# Patient Record
Sex: FEMALE | Race: BLACK OR AFRICAN AMERICAN | NOT HISPANIC OR LATINO | Employment: PART TIME | URBAN - METROPOLITAN AREA
[De-identification: names, ages, dates, MRNs, and addresses within clinical notes are randomized per-mention and may not be internally consistent; named-entity substitution may affect disease eponyms.]

---

## 2018-01-25 ENCOUNTER — CLINICAL SUPPORT (OUTPATIENT)
Dept: URGENT CARE | Facility: CLINIC | Age: 38
End: 2018-01-25

## 2018-01-25 DIAGNOSIS — Z02.83 ENCOUNTER FOR DRUG SCREENING: Primary | ICD-10-CM

## 2018-01-25 PROCEDURE — 80305 DRUG TEST PRSMV DIR OPT OBS: CPT | Mod: S$GLB,,, | Performed by: PREVENTIVE MEDICINE

## 2020-01-28 ENCOUNTER — TELEPHONE (OUTPATIENT)
Dept: INTERNAL MEDICINE | Facility: CLINIC | Age: 40
End: 2020-01-28

## 2021-08-10 ENCOUNTER — TELEPHONE (OUTPATIENT)
Dept: SURGERY | Facility: CLINIC | Age: 41
End: 2021-08-10

## 2021-12-23 ENCOUNTER — IMMUNIZATION (OUTPATIENT)
Dept: PRIMARY CARE CLINIC | Facility: CLINIC | Age: 41
End: 2021-12-23
Payer: COMMERCIAL

## 2021-12-23 DIAGNOSIS — Z23 NEED FOR VACCINATION: Primary | ICD-10-CM

## 2021-12-23 PROCEDURE — 91301 COVID-19, MRNA, LNP-S, PF, 100 MCG/0.5 ML DOSE VACCINE: CPT | Mod: PBBFAC | Performed by: INTERNAL MEDICINE

## 2024-11-18 ENCOUNTER — TELEPHONE (OUTPATIENT)
Dept: PODIATRY | Facility: CLINIC | Age: 44
End: 2024-11-18
Payer: COMMERCIAL

## 2024-11-18 NOTE — TELEPHONE ENCOUNTER
Spoke with pt. Regarding getting her scheduled for appointment. Pt. Verbally confirmed new appointment time and date.

## 2024-11-19 ENCOUNTER — OFFICE VISIT (OUTPATIENT)
Dept: INTERNAL MEDICINE | Facility: CLINIC | Age: 44
End: 2024-11-19
Payer: COMMERCIAL

## 2024-11-19 VITALS
HEART RATE: 77 BPM | OXYGEN SATURATION: 98 % | SYSTOLIC BLOOD PRESSURE: 122 MMHG | WEIGHT: 185 LBS | HEIGHT: 69 IN | DIASTOLIC BLOOD PRESSURE: 75 MMHG | BODY MASS INDEX: 27.4 KG/M2

## 2024-11-19 DIAGNOSIS — Z13.1 SCREENING FOR DIABETES MELLITUS: ICD-10-CM

## 2024-11-19 DIAGNOSIS — Z13.220 SCREENING FOR LIPID DISORDERS: ICD-10-CM

## 2024-11-19 DIAGNOSIS — F17.200 SMOKING: ICD-10-CM

## 2024-11-19 DIAGNOSIS — Z11.59 ENCOUNTER FOR HEPATITIS C SCREENING TEST FOR LOW RISK PATIENT: ICD-10-CM

## 2024-11-19 DIAGNOSIS — Z76.89 ENCOUNTER TO ESTABLISH CARE: Primary | ICD-10-CM

## 2024-11-19 DIAGNOSIS — Z12.11 SCREENING FOR COLON CANCER: ICD-10-CM

## 2024-11-19 DIAGNOSIS — R53.83 OTHER FATIGUE: ICD-10-CM

## 2024-11-19 DIAGNOSIS — Z12.4 CERVICAL CANCER SCREENING: ICD-10-CM

## 2024-11-19 DIAGNOSIS — Z11.4 SCREENING FOR HIV (HUMAN IMMUNODEFICIENCY VIRUS): ICD-10-CM

## 2024-11-19 DIAGNOSIS — Z12.31 ENCOUNTER FOR SCREENING MAMMOGRAM FOR MALIGNANT NEOPLASM OF BREAST: ICD-10-CM

## 2024-11-19 PROCEDURE — 99999 PR PBB SHADOW E&M-EST. PATIENT-LVL V: CPT | Mod: PBBFAC,,,

## 2024-11-19 RX ORDER — VARENICLINE TARTRATE 0.5 (11)-1
KIT ORAL
Qty: 1 EACH | Refills: 0 | Status: SHIPPED | OUTPATIENT
Start: 2024-11-19

## 2024-11-19 NOTE — PROGRESS NOTES
Subjective     Chief Complaint:   Patient reports to clinic today to establish care.     History of Present Illness:  Ms. Katlyn Birch is a 44 y.o. female         No recent illness     Significant Med HX  No known medical hx  Last say a dr 7 years ago  Few miscarriages, 2/2 weak cervic      Current Meds  Women essential vitamins  Ibuprofen for teeth  Needs a crown    Allergies  NKDA    Surgical Hx  Dental, root canal    Family Hx  DM  Cancer- Doesn't know details  Dementia      Tobacco  Heavy box of cigars daily; Black and milds. Have thought about quitting and wants help. Never used anything. 20+ years    Alcohol  No    Drugs  No    Social  7 and 22 year old. Single. No pets. Work- NPO, hlp people get back to workforce. Support system, lots of family local.       Breast-  Cervical   Colonoscopy  Lung cancer       PAST HISTORY:     No past medical history on file.    No past surgical history on file.    No family history on file.    Social History     Socioeconomic History    Marital status: Single   Tobacco Use    Smoking status: Never   Substance and Sexual Activity    Alcohol use: No    Drug use: No    Sexual activity: Yes     Partners: Male       MEDICATIONS & ALLERGIES:     Current Outpatient Medications on File Prior to Visit   Medication Sig    famotidine (PEPCID) 20 MG tablet TK 1 T PO  BID FOR 14 DAYS    ondansetron (ZOFRAN-ODT) 4 MG disintegrating tablet Take 4 mg by mouth every 8 (eight) hours as needed.      PRENATAL VITS W-CA,FE,FA,<1MG, (PRENATAL VITAMIN ORAL) Take by mouth.      promethazine (PHENERGAN) 12.5 MG tablet Take 12.5 mg by mouth every 6 (six) hours as needed.       No current facility-administered medications on file prior to visit.       Review of patient's allergies indicates:  No Known Allergies    OBJECTIVE:     Vital Signs:  Vitals:    11/19/24 1533   BP: 122/75   BP Location: Right arm   Patient Position: Sitting   Pulse: 77   SpO2: 98%   Weight: 83.9 kg (185 lb)   Height:  "5' 9" (1.753 m)       Body mass index is 27.32 kg/m².     Physical Exam  Vitals and nursing note reviewed.   Constitutional:       General: She is not in acute distress.     Appearance: She is not ill-appearing.   HENT:      Head: Normocephalic and atraumatic.      Right Ear: External ear normal.      Left Ear: External ear normal.      Mouth/Throat:      Mouth: Mucous membranes are moist.      Pharynx: Oropharynx is clear. No oropharyngeal exudate.   Eyes:      General: No scleral icterus.     Conjunctiva/sclera: Conjunctivae normal.      Pupils: Pupils are equal, round, and reactive to light.   Neck:      Vascular: No JVD.   Cardiovascular:      Rate and Rhythm: Normal rate and regular rhythm.      Pulses: Normal pulses.      Heart sounds: Normal heart sounds. No murmur heard.     No friction rub.   Pulmonary:      Effort: Pulmonary effort is normal. No respiratory distress.      Breath sounds: Normal breath sounds. No wheezing or rales.   Abdominal:      General: Abdomen is flat. Bowel sounds are normal. There is no distension.      Palpations: Abdomen is soft. There is no mass.      Tenderness: There is no abdominal tenderness. There is no rebound.   Musculoskeletal:         General: No tenderness or signs of injury. Normal range of motion.      Cervical back: Normal range of motion and neck supple.      Right lower leg: No edema.      Left lower leg: No edema.   Skin:     General: Skin is warm and dry.      Coloration: Skin is not jaundiced or pale.      Findings: No erythema.   Neurological:      General: No focal deficit present.      Mental Status: She is alert and oriented to person, place, and time.   Psychiatric:         Mood and Affect: Mood and affect normal.         Judgment: Judgment normal.       Laboratory  No results found for this or any previous visit (from the past 24 hours).    Diagnostic Results:      Health Maintenance Due   Topic Date Due    Hepatitis C Screening  Never done    Lipid Panel  " Never done    TETANUS VACCINE  Never done    Mammogram  Never done    Hemoglobin A1c (Diabetic Prevention Screening)  Never done    Cervical Cancer Screening  03/22/2015    Colorectal Cancer Screening  Never done    Influenza Vaccine (1) Never done    COVID-19 Vaccine (2 - 2024-25 season) 09/01/2024         ASSESSMENT & PLAN:     1. Encounter to establish care  -     Comprehensive Metabolic Panel; Future; Expected date: 11/19/2024  -     CBC Auto Differential; Future; Expected date: 11/19/2024  -     TSH; Future; Expected date: 11/19/2024  -     Hemoglobin A1C; Future; Expected date: 11/19/2024    2. Other fatigue    3. Screening for HIV (human immunodeficiency virus)  -     HIV 1/2 Ag/Ab (4th Gen); Future; Expected date: 11/19/2024    4. Screening for diabetes mellitus  -     Hemoglobin A1C; Future; Expected date: 11/19/2024    5. Screening for lipid disorders  -     Lipid Panel; Future; Expected date: 11/19/2024    6. Encounter for hepatitis C screening test for low risk patient  -     Hepatitis C Antibody; Future; Expected date: 11/19/2024    7. Screening for colon cancer  -     Ambulatory referral/consult to Endo Procedure ; Future; Expected date: 11/20/2024    8. Encounter for screening mammogram for malignant neoplasm of breast  -     Mammo Digital Screening Bilat w/ Sergio; Future; Expected date: 11/19/2024    9. Cervical cancer screening    10. Smoking  -     Ambulatory referral/consult to Smoking Cessation Program; Future; Expected date: 11/26/2024  -     Complete PFT w/ bronchodilator; Future  -     Ambulatory referral/consult to Lung Cancer Screening; Future; Expected date: 11/26/2024  -     CT Chest Lung Screening Low Dose; Future; Expected date: 11/19/2024    Other orders  -     varenicline (CHANTIX STARTING MONTH BOX) 0.5 mg (11)- 1 mg (42) tablet; Take one 0.5mg tab by mouth once daily X3 days,then increase to one 0.5mg tab twice daily X4 days,then increase to one 1mg tab twice daily  Dispense:  1 each; Refill: 0      CRC, Lung cancer, Breast cancer screening ordered    Follows with ob, will defer cervical cancer to them.     Smoker cessation program ref placed. Chantix ordered. PFT ordered    Will follow up labs RTC SHANTA Chamorro DO, MPH  Internal Medicine PGY-2  [facility]

## 2024-11-20 ENCOUNTER — LAB VISIT (OUTPATIENT)
Dept: LAB | Facility: HOSPITAL | Age: 44
End: 2024-11-20
Payer: COMMERCIAL

## 2024-11-20 DIAGNOSIS — Z76.89 ENCOUNTER TO ESTABLISH CARE: ICD-10-CM

## 2024-11-20 DIAGNOSIS — Z11.4 SCREENING FOR HIV (HUMAN IMMUNODEFICIENCY VIRUS): ICD-10-CM

## 2024-11-20 DIAGNOSIS — Z13.1 SCREENING FOR DIABETES MELLITUS: ICD-10-CM

## 2024-11-20 DIAGNOSIS — Z13.220 SCREENING FOR LIPID DISORDERS: ICD-10-CM

## 2024-11-20 LAB
BASOPHILS # BLD AUTO: 0.04 K/UL (ref 0–0.2)
BASOPHILS NFR BLD: 0.7 % (ref 0–1.9)
CHOLEST SERPL-MCNC: 190 MG/DL (ref 120–199)
CHOLEST/HDLC SERPL: 3.4 {RATIO} (ref 2–5)
DIFFERENTIAL METHOD BLD: ABNORMAL
EOSINOPHIL # BLD AUTO: 0.3 K/UL (ref 0–0.5)
EOSINOPHIL NFR BLD: 5.5 % (ref 0–8)
ERYTHROCYTE [DISTWIDTH] IN BLOOD BY AUTOMATED COUNT: 14.6 % (ref 11.5–14.5)
ESTIMATED AVG GLUCOSE: 105 MG/DL (ref 68–131)
HBA1C MFR BLD: 5.3 % (ref 4–5.6)
HCT VFR BLD AUTO: 36.2 % (ref 37–48.5)
HDLC SERPL-MCNC: 56 MG/DL (ref 40–75)
HDLC SERPL: 29.5 % (ref 20–50)
HGB BLD-MCNC: 11.8 G/DL (ref 12–16)
HIV 1+2 AB+HIV1 P24 AG SERPL QL IA: NORMAL
IMM GRANULOCYTES # BLD AUTO: 0.01 K/UL (ref 0–0.04)
IMM GRANULOCYTES NFR BLD AUTO: 0.2 % (ref 0–0.5)
LDLC SERPL CALC-MCNC: 106.8 MG/DL (ref 63–159)
LYMPHOCYTES # BLD AUTO: 2.4 K/UL (ref 1–4.8)
LYMPHOCYTES NFR BLD: 40.3 % (ref 18–48)
MCH RBC QN AUTO: 29.1 PG (ref 27–31)
MCHC RBC AUTO-ENTMCNC: 32.6 G/DL (ref 32–36)
MCV RBC AUTO: 89 FL (ref 82–98)
MONOCYTES # BLD AUTO: 0.7 K/UL (ref 0.3–1)
MONOCYTES NFR BLD: 10.8 % (ref 4–15)
NEUTROPHILS # BLD AUTO: 2.6 K/UL (ref 1.8–7.7)
NEUTROPHILS NFR BLD: 42.5 % (ref 38–73)
NONHDLC SERPL-MCNC: 134 MG/DL
NRBC BLD-RTO: 0 /100 WBC
PLATELET # BLD AUTO: 299 K/UL (ref 150–450)
PMV BLD AUTO: 10.1 FL (ref 9.2–12.9)
RBC # BLD AUTO: 4.05 M/UL (ref 4–5.4)
TRIGL SERPL-MCNC: 136 MG/DL (ref 30–150)
TSH SERPL DL<=0.005 MIU/L-ACNC: 1.44 UIU/ML (ref 0.4–4)
WBC # BLD AUTO: 6 K/UL (ref 3.9–12.7)

## 2024-11-20 PROCEDURE — 83036 HEMOGLOBIN GLYCOSYLATED A1C: CPT

## 2024-11-20 PROCEDURE — 36415 COLL VENOUS BLD VENIPUNCTURE: CPT

## 2024-11-20 PROCEDURE — 80061 LIPID PANEL: CPT

## 2024-11-20 PROCEDURE — 84443 ASSAY THYROID STIM HORMONE: CPT

## 2024-11-20 PROCEDURE — 87389 HIV-1 AG W/HIV-1&-2 AB AG IA: CPT

## 2024-11-20 PROCEDURE — 85025 COMPLETE CBC W/AUTO DIFF WBC: CPT

## 2024-11-29 ENCOUNTER — HOSPITAL ENCOUNTER (OUTPATIENT)
Dept: PULMONOLOGY | Facility: CLINIC | Age: 44
Discharge: HOME | End: 2024-11-29
Payer: COMMERCIAL

## 2024-11-29 ENCOUNTER — HOSPITAL ENCOUNTER (OUTPATIENT)
Dept: RADIOLOGY | Facility: HOSPITAL | Age: 44
Discharge: HOME | End: 2024-11-29
Payer: COMMERCIAL

## 2024-11-29 VITALS — BODY MASS INDEX: 26.58 KG/M2 | WEIGHT: 180 LBS

## 2024-11-29 DIAGNOSIS — F17.200 SMOKING: ICD-10-CM

## 2024-11-29 DIAGNOSIS — Z12.31 ENCOUNTER FOR SCREENING MAMMOGRAM FOR MALIGNANT NEOPLASM OF BREAST: ICD-10-CM

## 2024-11-29 LAB
DLCO ADJ PRE: 16.18 ML/(MIN*MMHG) (ref 21.69–33.16)
DLCO SINGLE BREATH LLN: 21.69
DLCO SINGLE BREATH PRE REF: 55.9 %
DLCO SINGLE BREATH REF: 27.42
DLCOC SBVA LLN: 3.55
DLCOC SBVA PRE REF: 69 %
DLCOC SBVA REF: 4.89
DLCOC SINGLE BREATH LLN: 21.69
DLCOC SINGLE BREATH PRE REF: 59 %
DLCOC SINGLE BREATH REF: 27.42
DLCOCSBVAULN: 6.23
DLCOCSINGLEBREATHULN: 33.16
DLCOCSINGLEBREATHZSCORE: -3.23
DLCOSINGLEBREATHULN: 33.16
DLCOSINGLEBREATHZSCORE: -3.47
DLCOVA LLN: 3.55
DLCOVA PRE REF: 65.4 %
DLCOVA PRE: 3.2 ML/(MIN*MMHG*L) (ref 3.55–6.23)
DLCOVA REF: 4.89
DLCOVAULN: 6.23
DLVAADJ PRE: 3.37 ML/(MIN*MMHG*L) (ref 3.55–6.23)
ERV LLN: -16448.93
ERV PRE REF: 48.1 %
ERV REF: 1.07
ERVULN: ABNORMAL
FEF 25 75 LLN: 2.15
FEF 25 75 PRE REF: 46.3 %
FEF 25 75 REF: 3.55
FET100 CHG: -12.7 %
FEV05 LLN: 1.39
FEV05 REF: 2.25
FEV1 CHG: 2.7 %
FEV1 FVC LLN: 71
FEV1 FVC PRE REF: 92.2 %
FEV1 FVC REF: 81
FEV1 LLN: 2.16
FEV1 PRE REF: 80.8 %
FEV1 REF: 2.82
FEV1 VOL CHG: 0.06
FEV1FVCZSCORE: -1.03
FEV1ZSCORE: -1.35
FRCPLETH LLN: 2.09
FRCPLETH PREREF: 99.5 %
FRCPLETH REF: 2.91
FRCPLETHULN: 3.74
FVC CHG: -3.2 %
FVC LLN: 2.71
FVC PRE REF: 87.2 %
FVC REF: 3.5
FVC VOL CHG: -0.1
FVCZSCORE: -0.92
IVC PRE: 3.12 L (ref 2.71–4.32)
IVC SINGLE BREATH LLN: 2.71
IVC SINGLE BREATH PRE REF: 89.3 %
IVC SINGLE BREATH REF: 3.5
IVCSINGLEBREATHULN: 4.32
LLN IC: -16447.38
PEF LLN: 4.83
PEF PRE REF: 65.3 %
PEF REF: 7.09
POST FEF 25 75: 2.2 L/S (ref 2.15–4.95)
POST FET 100: 5.28 SEC
POST FEV1 FVC: 79.36 % (ref 70.52–90.01)
POST FEV1: 2.34 L (ref 2.16–3.46)
POST FEV5: 1.81 L (ref 1.39–3.1)
POST FVC: 2.95 L (ref 2.71–4.32)
POST PEF: 4.9 L/S (ref 4.83–9.35)
PRE DLCO: 15.32 ML/(MIN*MMHG) (ref 21.69–33.16)
PRE ERV: 0.51 L (ref -16448.93–16451.07)
PRE FEF 25 75: 1.64 L/S (ref 2.15–4.95)
PRE FET 100: 6.05 SEC
PRE FEV05 REF: 75.2 %
PRE FEV1 FVC: 74.78 % (ref 70.52–90.01)
PRE FEV1: 2.28 L (ref 2.16–3.46)
PRE FEV5: 1.69 L (ref 1.39–3.1)
PRE FRC PL: 2.9 L (ref 2.09–3.74)
PRE FVC: 3.05 L (ref 2.71–4.32)
PRE IC: 2.61 L (ref -16447.38–16452.62)
PRE PEF: 4.63 L/S (ref 4.83–9.35)
PRE REF IC: 99.6 %
PRE RV: 2.38 L (ref 1.27–2.42)
PRE TLC: 5.51 L (ref 4.62–6.6)
RAW PRE REF: 75.3 %
RAW PRE: 2.3 CMH2O*S/L (ref 3.06–3.06)
RAW REF: 3.06
REF IC: 2.62
RV LLN: 1.27
RV PRE REF: 129.2 %
RV REF: 1.85
RVTLC LLN: 25
RVTLC PRE REF: 126.4 %
RVTLC PRE: 43.29 % (ref 24.67–43.85)
RVTLC REF: 34
RVTLCULN: 44
RVULN: 2.42
SGAW PRE REF: 127.9 %
SGAW PRE: 0.13 1/(CMH2O*S) (ref 0.1–0.1)
SGAW REF: 0.1
TLC LLN: 4.62
TLC PRE REF: 98.2 %
TLC REF: 5.61
TLC ULN: 6.6
TLCZSCORE: -0.17
ULN IC: ABNORMAL
VA PRE: 4.79 L (ref 5.46–5.46)
VA SINGLE BREATH LLN: 5.46
VA SINGLE BREATH PRE REF: 87.8 %
VA SINGLE BREATH REF: 5.46
VASINGLEBREATHULN: 5.46
VC LLN: 2.71
VC PRE REF: 89.3 %
VC PRE: 3.12 L (ref 2.71–4.32)
VC REF: 3.5
VC ULN: 4.32

## 2024-11-29 PROCEDURE — 77063 BREAST TOMOSYNTHESIS BI: CPT | Mod: TC

## 2024-11-29 PROCEDURE — 71271 CT THORAX LUNG CANCER SCR C-: CPT | Mod: TC

## 2024-11-29 PROCEDURE — 94060 EVALUATION OF WHEEZING: CPT | Mod: S$GLB,,, | Performed by: INTERNAL MEDICINE

## 2024-11-29 PROCEDURE — 77063 BREAST TOMOSYNTHESIS BI: CPT | Mod: 26,,, | Performed by: RADIOLOGY

## 2024-11-29 PROCEDURE — 77067 SCR MAMMO BI INCL CAD: CPT | Mod: 26,,, | Performed by: RADIOLOGY

## 2024-11-29 PROCEDURE — 94726 PLETHYSMOGRAPHY LUNG VOLUMES: CPT | Mod: S$GLB,,, | Performed by: INTERNAL MEDICINE

## 2024-11-29 PROCEDURE — 94729 DIFFUSING CAPACITY: CPT | Mod: S$GLB,,, | Performed by: INTERNAL MEDICINE

## 2024-11-29 PROCEDURE — 71271 CT THORAX LUNG CANCER SCR C-: CPT | Mod: 26,,, | Performed by: RADIOLOGY

## 2024-12-01 LAB
DLCO ADJ PRE: 16.18 ML/(MIN*MMHG) (ref 21.69–33.16)
DLCO SINGLE BREATH LLN: 21.69
DLCO SINGLE BREATH PRE REF: 55.9 %
DLCO SINGLE BREATH REF: 27.42
DLCOC SBVA LLN: 3.55
DLCOC SBVA PRE REF: 69 %
DLCOC SBVA REF: 4.89
DLCOC SINGLE BREATH LLN: 21.69
DLCOC SINGLE BREATH PRE REF: 59 %
DLCOC SINGLE BREATH REF: 27.42
DLCOCSBVAULN: 6.23
DLCOCSINGLEBREATHULN: 33.16
DLCOCSINGLEBREATHZSCORE: -3.23
DLCOSINGLEBREATHULN: 33.16
DLCOSINGLEBREATHZSCORE: -3.47
DLCOVA LLN: 3.55
DLCOVA PRE REF: 65.4 %
DLCOVA PRE: 3.2 ML/(MIN*MMHG*L) (ref 3.55–6.23)
DLCOVA REF: 4.89
DLCOVAULN: 6.23
DLVAADJ PRE: 3.37 ML/(MIN*MMHG*L) (ref 3.55–6.23)
ERV LLN: -16448.93
ERV PRE REF: 48.1 %
ERV REF: 1.07
ERVULN: ABNORMAL
FEF 25 75 LLN: 2.15
FEF 25 75 PRE REF: 46.3 %
FEF 25 75 REF: 3.55
FET100 CHG: -12.7 %
FEV05 LLN: 1.39
FEV05 REF: 2.25
FEV1 CHG: 2.7 %
FEV1 FVC LLN: 71
FEV1 FVC PRE REF: 92.2 %
FEV1 FVC REF: 81
FEV1 LLN: 2.16
FEV1 PRE REF: 80.8 %
FEV1 REF: 2.82
FEV1 VOL CHG: 0.06
FEV1FVCZSCORE: -1.03
FEV1ZSCORE: -1.35
FRCPLETH LLN: 2.09
FRCPLETH PREREF: 99.5 %
FRCPLETH REF: 2.91
FRCPLETHULN: 3.74
FVC CHG: -3.2 %
FVC LLN: 2.71
FVC PRE REF: 87.2 %
FVC REF: 3.5
FVC VOL CHG: -0.1
FVCZSCORE: -0.92
IVC PRE: 3.12 L (ref 2.71–4.32)
IVC SINGLE BREATH LLN: 2.71
IVC SINGLE BREATH PRE REF: 89.3 %
IVC SINGLE BREATH REF: 3.5
IVCSINGLEBREATHULN: 4.32
LLN IC: -16447.38
PEF LLN: 4.83
PEF PRE REF: 65.3 %
PEF REF: 7.09
PHYSICIAN COMMENT: ABNORMAL
POST FEF 25 75: 2.2 L/S (ref 2.15–4.95)
POST FET 100: 5.28 SEC
POST FEV1 FVC: 79.36 % (ref 70.52–90.01)
POST FEV1: 2.34 L (ref 2.16–3.46)
POST FEV5: 1.81 L (ref 1.39–3.1)
POST FVC: 2.95 L (ref 2.71–4.32)
POST PEF: 4.9 L/S (ref 4.83–9.35)
PRE DLCO: 15.32 ML/(MIN*MMHG) (ref 21.69–33.16)
PRE ERV: 0.51 L (ref -16448.93–16451.07)
PRE FEF 25 75: 1.64 L/S (ref 2.15–4.95)
PRE FET 100: 6.05 SEC
PRE FEV05 REF: 75.2 %
PRE FEV1 FVC: 74.78 % (ref 70.52–90.01)
PRE FEV1: 2.28 L (ref 2.16–3.46)
PRE FEV5: 1.69 L (ref 1.39–3.1)
PRE FRC PL: 2.9 L (ref 2.09–3.74)
PRE FVC: 3.05 L (ref 2.71–4.32)
PRE IC: 2.61 L (ref -16447.38–16452.62)
PRE PEF: 4.63 L/S (ref 4.83–9.35)
PRE REF IC: 99.6 %
PRE RV: 2.38 L (ref 1.27–2.42)
PRE TLC: 5.51 L (ref 4.62–6.6)
RAW PRE REF: 75.3 %
RAW PRE: 2.3 CMH2O*S/L (ref 3.06–3.06)
RAW REF: 3.06
REF IC: 2.62
RV LLN: 1.27
RV PRE REF: 129.2 %
RV REF: 1.85
RVTLC LLN: 25
RVTLC PRE REF: 126.4 %
RVTLC PRE: 43.29 % (ref 24.67–43.85)
RVTLC REF: 34
RVTLCULN: 44
RVULN: 2.42
SGAW PRE REF: 127.9 %
SGAW PRE: 0.13 1/(CMH2O*S) (ref 0.1–0.1)
SGAW REF: 0.1
TLC LLN: 4.62
TLC PRE REF: 98.2 %
TLC REF: 5.61
TLC ULN: 6.6
TLCZSCORE: -0.17
ULN IC: ABNORMAL
VA PRE: 4.79 L (ref 5.46–5.46)
VA SINGLE BREATH LLN: 5.46
VA SINGLE BREATH PRE REF: 87.8 %
VA SINGLE BREATH REF: 5.46
VASINGLEBREATHULN: 5.46
VC LLN: 2.71
VC PRE REF: 89.3 %
VC PRE: 3.12 L (ref 2.71–4.32)
VC REF: 3.5
VC ULN: 4.32

## 2024-12-13 ENCOUNTER — PATIENT MESSAGE (OUTPATIENT)
Dept: ENDOSCOPY | Facility: HOSPITAL | Age: 44
End: 2024-12-13

## 2024-12-13 ENCOUNTER — CLINICAL SUPPORT (OUTPATIENT)
Dept: ENDOSCOPY | Facility: HOSPITAL | Age: 44
End: 2024-12-13
Payer: COMMERCIAL

## 2024-12-13 ENCOUNTER — TELEPHONE (OUTPATIENT)
Dept: ENDOSCOPY | Facility: HOSPITAL | Age: 44
End: 2024-12-13

## 2024-12-13 DIAGNOSIS — Z12.11 SCREENING FOR COLON CANCER: ICD-10-CM

## 2024-12-13 NOTE — TELEPHONE ENCOUNTER
Attempted to contact patient to schedule colonoscopy. The patient did not answer the call. Left voice message requesting a call back at [phone] to get procedure scheduled.

## 2024-12-13 NOTE — PLAN OF CARE
Attempted to contact patient to schedule colonoscopy. The patient did not answer the call. Left voice message requesting a call back at [phone] to get procedure scheduled.

## 2024-12-20 ENCOUNTER — PATIENT MESSAGE (OUTPATIENT)
Dept: PODIATRY | Facility: CLINIC | Age: 44
End: 2024-12-20
Payer: COMMERCIAL

## 2024-12-23 ENCOUNTER — TELEPHONE (OUTPATIENT)
Dept: PODIATRY | Facility: CLINIC | Age: 44
End: 2024-12-23
Payer: COMMERCIAL

## 2024-12-23 NOTE — TELEPHONE ENCOUNTER
Call pt. To get her rescheduled due to her missed appointment with Dr. Grewal on 12/23/2024. Pt. Did not answer so I left detailed voice message for her to contact us at [phone] to get her rescheduled or contact us through [facility] my chart.